# Patient Record
Sex: MALE | URBAN - METROPOLITAN AREA
[De-identification: names, ages, dates, MRNs, and addresses within clinical notes are randomized per-mention and may not be internally consistent; named-entity substitution may affect disease eponyms.]

---

## 2017-01-24 ENCOUNTER — APPOINTMENT (RX ONLY)
Dept: URBAN - METROPOLITAN AREA CLINIC 77 | Facility: CLINIC | Age: 32
Setting detail: DERMATOLOGY
End: 2017-01-24

## 2017-01-24 DIAGNOSIS — L72.0 EPIDERMAL CYST: ICD-10-CM

## 2017-01-24 DIAGNOSIS — L64.8 OTHER ANDROGENIC ALOPECIA: ICD-10-CM

## 2017-01-24 DIAGNOSIS — L85.3 XEROSIS CUTIS: ICD-10-CM

## 2017-01-24 PROCEDURE — 99203 OFFICE O/P NEW LOW 30 MIN: CPT

## 2017-01-24 PROCEDURE — ? PRESCRIPTION

## 2017-01-24 PROCEDURE — ? TREATMENT REGIMEN

## 2017-01-24 PROCEDURE — ? COUNSELING

## 2017-01-24 RX ORDER — FINASTERIDE 5 MG/1
TABLET, FILM COATED ORAL
Qty: 30 | Refills: 6 | Status: ERX | COMMUNITY
Start: 2017-01-24

## 2017-01-24 RX ADMIN — FINASTERIDE: 5 TABLET, FILM COATED ORAL at 16:47

## 2017-01-24 ASSESSMENT — LOCATION ZONE DERM
LOCATION ZONE: SCALP
LOCATION ZONE: EYELID
LOCATION ZONE: TRUNK

## 2017-01-24 ASSESSMENT — LOCATION SIMPLE DESCRIPTION DERM
LOCATION SIMPLE: RIGHT SCALP
LOCATION SIMPLE: RIGHT UPPER BACK
LOCATION SIMPLE: POSTERIOR SCALP
LOCATION SIMPLE: LEFT INFERIOR EYELID
LOCATION SIMPLE: ABDOMEN

## 2017-01-24 ASSESSMENT — LOCATION DETAILED DESCRIPTION DERM
LOCATION DETAILED: RIGHT MEDIAL FRONTAL SCALP
LOCATION DETAILED: POSTERIOR MID-PARIETAL SCALP
LOCATION DETAILED: LEFT LATERAL INFERIOR EYELID
LOCATION DETAILED: EPIGASTRIC SKIN
LOCATION DETAILED: RIGHT SUPERIOR UPPER BACK

## 2017-01-24 NOTE — PROCEDURE: TREATMENT REGIMEN
Detail Level: Zone
Plan: Location: scalp\\nPrescribe: Finasteride 5mg po QD (take 1/4 a day)\\nHad a long discussion with patient about male pattern baldness and the best treatment options to prevent further hair loss. Discussed with patient that Finasteride blocks 5alpha reductase and decreases levels of Dihydrotestosterone levels to prevent miniaturization of the hair follicle.\\nEducated patient on side effects/risks from medication and what to expect from taking the oral medication\\nDiscussed using OTC products such as Rogaine that is a temporary treatment as opposed to taking Finasteride oral medication\\nDiscussed that all the side effects are temporary and if they do occur we can reduce the dose of the medication or stop it----no evidence of increased incidence of permanent impotence\\nRecommend using Rogaine to help produce hair growth from the external as well---discussed hair may fall out for the first few weeks.

## 2018-02-02 ENCOUNTER — APPOINTMENT (RX ONLY)
Dept: URBAN - METROPOLITAN AREA CLINIC 77 | Facility: CLINIC | Age: 33
Setting detail: DERMATOLOGY
End: 2018-02-02

## 2018-02-02 DIAGNOSIS — L64.8 OTHER ANDROGENIC ALOPECIA: ICD-10-CM

## 2018-02-02 DIAGNOSIS — L20.89 OTHER ATOPIC DERMATITIS: ICD-10-CM

## 2018-02-02 DIAGNOSIS — L85.3 XEROSIS CUTIS: ICD-10-CM

## 2018-02-02 DIAGNOSIS — L70.2 ACNE VARIOLIFORMIS: ICD-10-CM

## 2018-02-02 PROBLEM — L20.84 INTRINSIC (ALLERGIC) ECZEMA: Status: ACTIVE | Noted: 2018-02-02

## 2018-02-02 PROCEDURE — ? PRESCRIPTION

## 2018-02-02 PROCEDURE — ? COUNSELING

## 2018-02-02 PROCEDURE — ? TREATMENT REGIMEN

## 2018-02-02 PROCEDURE — 99213 OFFICE O/P EST LOW 20 MIN: CPT

## 2018-02-02 RX ORDER — MINOCYCLINE HYDROCHLORIDE 115 MG/1
TABLET, FILM COATED, EXTENDED RELEASE ORAL
Qty: 30 | Refills: 12 | Status: ERX | COMMUNITY
Start: 2018-02-02

## 2018-02-02 RX ORDER — FINASTERIDE 5 MG/1
TABLET, FILM COATED ORAL
Qty: 30 | Refills: 6 | Status: ERX

## 2018-02-02 RX ORDER — CLOBETASOL PROPIONATE 0.5 MG/G
AEROSOL, FOAM TOPICAL
Qty: 1 | Refills: 12 | Status: ERX | COMMUNITY
Start: 2018-02-02

## 2018-02-02 RX ADMIN — MINOCYCLINE HYDROCHLORIDE: 115 TABLET, FILM COATED, EXTENDED RELEASE ORAL at 15:02

## 2018-02-02 RX ADMIN — CLOBETASOL PROPIONATE: 0.5 AEROSOL, FOAM TOPICAL at 15:01

## 2018-02-02 ASSESSMENT — LOCATION SIMPLE DESCRIPTION DERM
LOCATION SIMPLE: POSTERIOR SCALP
LOCATION SIMPLE: LEFT FOREARM
LOCATION SIMPLE: RIGHT UPPER BACK
LOCATION SIMPLE: RIGHT SCALP
LOCATION SIMPLE: ABDOMEN

## 2018-02-02 ASSESSMENT — LOCATION DETAILED DESCRIPTION DERM
LOCATION DETAILED: EPIGASTRIC SKIN
LOCATION DETAILED: RIGHT MEDIAL FRONTAL SCALP
LOCATION DETAILED: LEFT PROXIMAL DORSAL FOREARM
LOCATION DETAILED: POSTERIOR MID-PARIETAL SCALP
LOCATION DETAILED: RIGHT SUPERIOR UPPER BACK

## 2018-02-02 ASSESSMENT — LOCATION ZONE DERM
LOCATION ZONE: SCALP
LOCATION ZONE: ARM
LOCATION ZONE: TRUNK

## 2018-02-02 NOTE — PROCEDURE: TREATMENT REGIMEN
Detail Level: Zone
Plan: Location: scalp\\nPharmacy: \\nPrescribe: Refilled today ---  Finasteride 5mg po QD (take 1/4 a day)\\n\\nPatient is here for a follow up \\nPatient is taking Finasteride by mouth every other day \\nPatient states he has noticed significant improvement \\nHe also states his stress level is 10 times better then when he was here last.\\n\\nHad a long discussion with patient about male pattern baldness and the best treatment options to prevent further hair loss. Discussed with patient that Finasteride blocks 5alpha reductase and decreases levels of Dihydrotestosterone levels to prevent miniaturization of the hair follicle.\\nEducated patient on side effects/risks from medication and what to expect from taking the oral medication\\n\\nDiscussed that all the side effects are temporary and if they do occur we can reduce the dose of the medication or stop it----no evidence of increased incidence of permanent impotence.
Plan: Location: Left posterior forearm\\nPharmacy: [facility] \\nPrescription: Olux-E foam  (1 sample of Topicort spray has been given to patient to use 1-2 times a day)\\n\\nPatient presents today with a Atopic Dermatitis\\nPatient complains of itching, states rash gets worse during the winter \\nHe has been using OTC cortisone cream with slight improvement \\n\\nDiscussed with patient that eczema can be triggered by stress, lack of sleep and genetics \\nExplained to patient that it is extremely important to keep skin moisturized to try to keep the skin moist to keep from flaring up \\n\\nToday I will be starting patient on Olux-E foam to be applied on daily as needed for flare ups \\Cyndy well as using a moisturizing cream to help with keeping skin moisturized \\n\\nFollow up: 1 year
Plan: Location: Posterior scalp\\nPharmacy: [facility] \\nPrescription: Solodyn 115mg daily \\n\\nPatient presents with multiple scattered papules located on the posterior scalp \\nPatient states scalp flares up usually a couple days after he has worked out, he thinks sweat might trigger it flare up \\nHe not been using topicals to treat scalp \\n\\nExplained to patient that numerous things can cause his scalp to flare up such as stress, lack of sleep and even genetics \\nPatient denies being under any stress \\n\\nDiscussed with patient that today I will be prescribing a oral antibiotic Solodyn to be taken once a day as needed, which will control the flare ups \\n\\nFollow up: 1 year